# Patient Record
Sex: MALE | Race: WHITE | NOT HISPANIC OR LATINO | Employment: FULL TIME | URBAN - METROPOLITAN AREA
[De-identification: names, ages, dates, MRNs, and addresses within clinical notes are randomized per-mention and may not be internally consistent; named-entity substitution may affect disease eponyms.]

---

## 2023-10-31 ENCOUNTER — PREP FOR PROCEDURE (OUTPATIENT)
Age: 53
End: 2023-10-31

## 2023-10-31 ENCOUNTER — TELEPHONE (OUTPATIENT)
Age: 53
End: 2023-10-31

## 2023-10-31 DIAGNOSIS — Z12.11 COLON CANCER SCREENING: Primary | ICD-10-CM

## 2023-10-31 NOTE — TELEPHONE ENCOUNTER
10/31/23  Screened by: Thania Downing      Pre- Screenin.7kg BMI  Has patient been referred for a routine screening Colonoscopy? no  Is the patient between 43-73 years old? yes      Previous Colonoscopy yes   If yes:    Date:     Facility: [address] [place] [address]    Reason: ROUTINE      SCHEDULING STAFF: If the patient is between 45yrs-49yrs, please advise patient to confirm benefits/coverage with their insurance company for a routine screening colonoscopy, some insurance carriers will only cover at [facility] [place] [address], [facility] or older. If the patient is over 66years old, please schedule an office visit. Does the patient want to see a Gastroenterologist prior to their procedure OR are they having any GI symptoms? no    Has the patient been hospitalized or had abdominal surgery in the past 6 months? no    Does the patient use supplemental oxygen? no    Does the patient take Coumadin, Lovenox, Plavix, Elliquis, Xarelto, or other blood thinning medication? no    Has the patient had a stroke, cardiac event, or stent placed in the past year? no    SCHEDULING STAFF: If patient answers NO to above questions, then schedule procedure. If patient answers YES to above questions, then schedule office appointment. If patient is between 45yrs - 49yrs, please advise patient that we will have to confirm benefits & coverage with their insurance company for a routine screening colonoscopy.     PASSED OA

## 2023-10-31 NOTE — TELEPHONE ENCOUNTER
Scheduled date of colonoscopy (as of today): 12/14/2023  Physician performing colonoscopy: Yessi Farrar  Location of colonoscopy: [facility]  Bowel prep reviewed with patient: BEN/ROCÍO  Instructions reviewed with patient by:   Clearances: N/A    : RIGO [phone]

## 2023-12-14 ENCOUNTER — ANESTHESIA (OUTPATIENT)
Dept: GASTROENTEROLOGY | Facility: HOSPITAL | Age: 53
End: 2023-12-14

## 2023-12-14 ENCOUNTER — ANESTHESIA EVENT (OUTPATIENT)
Dept: GASTROENTEROLOGY | Facility: HOSPITAL | Age: 53
End: 2023-12-14

## 2023-12-14 ENCOUNTER — HOSPITAL ENCOUNTER (OUTPATIENT)
Dept: GASTROENTEROLOGY | Facility: HOSPITAL | Age: 53
Setting detail: OUTPATIENT SURGERY
End: 2023-12-14
Attending: INTERNAL MEDICINE
Payer: COMMERCIAL

## 2023-12-14 VITALS
RESPIRATION RATE: 20 BRPM | HEART RATE: 48 BPM | SYSTOLIC BLOOD PRESSURE: 97 MMHG | BODY MASS INDEX: 20.96 KG/M2 | HEIGHT: 72 IN | OXYGEN SATURATION: 100 % | DIASTOLIC BLOOD PRESSURE: 66 MMHG | TEMPERATURE: 98 F | WEIGHT: 154.76 LBS

## 2023-12-14 DIAGNOSIS — Z12.11 COLON CANCER SCREENING: ICD-10-CM

## 2023-12-14 PROCEDURE — G0121 COLON CA SCRN NOT HI RSK IND: HCPCS | Performed by: INTERNAL MEDICINE

## 2023-12-14 RX ORDER — SODIUM CHLORIDE, SODIUM LACTATE, POTASSIUM CHLORIDE, CALCIUM CHLORIDE 600; 310; 30; 20 MG/100ML; MG/100ML; MG/100ML; MG/100ML
INJECTION, SOLUTION INTRAVENOUS CONTINUOUS PRN
Status: DISCONTINUED | OUTPATIENT
Start: 2023-12-14 | End: 2023-12-14

## 2023-12-14 RX ORDER — LIDOCAINE HYDROCHLORIDE 10 MG/ML
INJECTION, SOLUTION EPIDURAL; INFILTRATION; INTRACAUDAL; PERINEURAL AS NEEDED
Status: DISCONTINUED | OUTPATIENT
Start: 2023-12-14 | End: 2023-12-14

## 2023-12-14 RX ORDER — PROPOFOL 10 MG/ML
INJECTION, EMULSION INTRAVENOUS AS NEEDED
Status: DISCONTINUED | OUTPATIENT
Start: 2023-12-14 | End: 2023-12-14

## 2023-12-14 RX ADMIN — PROPOFOL 30 MG: 10 INJECTION, EMULSION INTRAVENOUS at 08:09

## 2023-12-14 RX ADMIN — SODIUM CHLORIDE, SODIUM LACTATE, POTASSIUM CHLORIDE, AND CALCIUM CHLORIDE: .6; .31; .03; .02 INJECTION, SOLUTION INTRAVENOUS at 07:50

## 2023-12-14 RX ADMIN — LIDOCAINE HYDROCHLORIDE 50 MG: 10 INJECTION, SOLUTION EPIDURAL; INFILTRATION; INTRACAUDAL; PERINEURAL at 08:00

## 2023-12-14 RX ADMIN — PROPOFOL 50 MG: 10 INJECTION, EMULSION INTRAVENOUS at 08:03

## 2023-12-14 RX ADMIN — PROPOFOL 100 MG: 10 INJECTION, EMULSION INTRAVENOUS at 08:00

## 2023-12-14 RX ADMIN — PROPOFOL 20 MG: 10 INJECTION, EMULSION INTRAVENOUS at 08:06

## 2023-12-14 NOTE — ANESTHESIA POSTPROCEDURE EVALUATION
Post-Op Assessment Note    CV Status:  Stable  Pain Score: 0    Pain management: adequate       Mental Status:  Alert and awake   Hydration Status:  Stable   PONV Controlled:  None   Airway Patency:  Patent and adequate     Post Op Vitals Reviewed: Yes      Staff: Anesthesiologist, CRNA

## 2023-12-14 NOTE — H&P
History and Physical -  [facility]  Neelam Davis 52 y.o. male MRN: [id number]                  HPI: Neelam Davis is a 52y.o. year old male who presents for colonoscopy for family history of colon polyps in the father in his 46s. Last colonoscopy 5 years ago      REVIEW OF SYSTEMS: Per the HPI, and otherwise unremarkable. Historical Information   No past medical history on file. No past surgical history on file. Social History   Social History     Substance and Sexual Activity   Alcohol Use Not on file     Social History     Substance and Sexual Activity   Drug Use Not on file     Social History     Tobacco Use   Smoking Status Not on file   Smokeless Tobacco Not on file     No family history on file. Meds/Allergies     (Not in a hospital admission)      Not on File    Objective     There were no vitals taken for this visit.       PHYSICAL EXAM    Gen: NAD  CV: RRR  CHEST: Clear  ABD: soft, NT/ND  EXT: no edema  Neuro: AAO      ASSESSMENT/PLAN:  This is a 52y.o. year old male here for family history of colon polyps    PLAN:   Procedure: Colonoscopy

## 2023-12-14 NOTE — INTERVAL H&P NOTE
H&P reviewed. After examining the patient I find no changes in the patients condition since the H&P had been written.     Vitals:    12/14/23 0722   BP: 117/67   Pulse: (!) 54   Resp: 16   Temp: 97.7 °F (36.5 °C)   SpO2: 100%

## 2023-12-14 NOTE — ANESTHESIA PREPROCEDURE EVALUATION
Procedure:  COLONOSCOPY    Relevant Problems   No relevant active problems    No significant PMH  Had colonoscopy 5 years ago   No anesthesia complications     Physical Exam    Airway    Mallampati score: I  TM Distance: >3 FB  Neck ROM: full     Dental   No notable dental hx     Cardiovascular  Cardiovascular exam normal    Pulmonary  Pulmonary exam normal     Other Findings        Anesthesia Plan  ASA Score- 1     Anesthesia Type- IV sedation with anesthesia with ASA Monitors. Additional Monitors:     Airway Plan:            Plan Factors-Exercise tolerance (METS): >4 METS. Chart reviewed. Patient summary reviewed. Patient is not a current smoker. Induction-     Postoperative Plan-     Informed Consent- Anesthetic plan and risks discussed with patient and spouse. I personally reviewed this patient with the CRNA. Discussed and agreed on the Anesthesia Plan with the CRNA. John Zimmerman

## 2023-12-18 ENCOUNTER — NURSE TRIAGE (OUTPATIENT)
Age: 53
End: 2023-12-18

## 2023-12-18 NOTE — TELEPHONE ENCOUNTER
Regarding: Post op Bleeding  ----- Message from Amy Dominguez sent at 12/18/2023 11:07 AM EST -----  Patient calling in with concerns regarding bleeding from colonoscopy procedure performed on 12/14/2023. Patient was advised to call in if he is experiencing any post op bleeding.

## 2023-12-18 NOTE — TELEPHONE ENCOUNTER
"  Colonoscopy was on Thursday.  Saw red blood on Friday with wiping only.  Did not see any other the weekend. Again saw some with wiping this morning.  Patient does have a history of hemorrhoids and having some constipation.    Patient will take dulcolax tonight to see if that helps with the constipation. We also discussed Miralax which he does not currently have at home.  Patient instructed to call with increase in bleeding for blood clots with any lightheaded or dizziness or any concerning changes.  Verbalized understanding.         Reason for Disposition   Rectal bleeding is minimal (e.g., blood just on toilet paper, a few drops in toilet bowl)    Answer Assessment - Initial Assessment Questions  1. APPEARANCE of BLOOD: \"What color is it?\" Bright red blood with wiping.   2. AMOUNT: \"How much blood was passed?\" Small         3. FREQUENCY: \"How many times has blood been passed with the stools?\"     First noticed Friday night.  Small amount again this morning.   4. ONSET: \"When was the blood first seen in the stools?\" (Days or weeks)      Friday and today   5. DIARRHEA: \"Is there also some diarrhea?\" If Yes, ask: \"How many diarrhea stools were passed in past 24 hours?\"     denies  6. CONSTIPATION: \"Do you have constipation?\" If Yes, ask: \"How bad is it?\"    \" Stool is not soft a little constipated\"  7. RECURRENT SYMPTOMS: \"Have you had blood in your stools before?\" If Yes, ask: \"When was the last time?\" and \"What happened that time?\"       Colonoscopy was on Thursday.  Saw red blood on Friday with wiping only.  Did not see any other the weekend. Again saw some with wiping this morning.  Patient does have a history of hemorrhoids and having some constipation.    Protocols used: Rectal Bleeding-ADULT-[place]    "

## 2023-12-18 NOTE — TELEPHONE ENCOUNTER
LMOM FOR PT TO RETURN CALL.     Reason for Disposition   Message left on identified voicemail    Protocols used: No Contact or Duplicate Contact Call-ADULT-[place]

## 2024-01-03 ENCOUNTER — OFFICE VISIT (OUTPATIENT)
Dept: URGENT CARE | Facility: CLINIC | Age: 54
End: 2024-01-03
Payer: COMMERCIAL

## 2024-01-03 VITALS
HEART RATE: 65 BPM | DIASTOLIC BLOOD PRESSURE: 68 MMHG | OXYGEN SATURATION: 98 % | TEMPERATURE: 97.6 F | SYSTOLIC BLOOD PRESSURE: 112 MMHG | RESPIRATION RATE: 17 BRPM

## 2024-01-03 DIAGNOSIS — B02.9 HERPES ZOSTER WITHOUT COMPLICATION: Primary | ICD-10-CM

## 2024-01-03 PROBLEM — Z83.719 FAMILY HISTORY OF COLONIC POLYPS: Status: ACTIVE | Noted: 2018-03-09

## 2024-01-03 PROBLEM — K12.2 CELLULITIS AND ABSCESS OF MOUTH: Status: ACTIVE | Noted: 2023-05-03

## 2024-01-03 PROBLEM — K02.53 DENTAL CARIES ON PIT AND FISSURE SURFACE PENETRATING INTO PULP: Status: ACTIVE | Noted: 2023-05-03

## 2024-01-03 PROCEDURE — G0382 LEV 3 HOSP TYPE B ED VISIT: HCPCS

## 2024-01-03 RX ORDER — VALACYCLOVIR HYDROCHLORIDE 1 G/1
1000 TABLET, FILM COATED ORAL 3 TIMES DAILY
Qty: 21 TABLET | Refills: 0 | Status: SHIPPED | OUTPATIENT
Start: 2024-01-03 | End: 2024-01-10

## 2024-01-03 RX ORDER — PREDNISONE 20 MG/1
40 TABLET ORAL DAILY
Qty: 10 TABLET | Refills: 0 | Status: SHIPPED | OUTPATIENT
Start: 2024-01-03 | End: 2024-01-08

## 2024-01-03 NOTE — PROGRESS NOTES
[facility]        NAME: Charlie Davis is a 53 y.o. male  : 1970    MRN: [id number]  DATE: January 3, 2024  TIME: 10:31 AM    Assessment and Plan   Herpes zoster without complication [B02.9]  1. Herpes zoster without complication  predniSONE 20 mg tablet    valACYclovir (VALTREX) 1,000 mg tablet        Rash suspicious for shingles. Will start on Valtrex and Prednisone for pain control. VSS in clinic, appears in no acute distress. Educated on use of OTC products for additional relief of symptoms. Advised close follow-up with PCP or to report to the ER if symptoms worsen. Patient verbalizes understanding and agreeable to plan.     Patient Instructions     Take medications as directed. Avoid close contact with others especially pregnant women while new blisters are appearing, until rash is crusted over. Follow-up with PCP in 3-5 days if no improvement of symptoms/once complete treatment regarding Shingles vaccine to prevent future outbreaks. Report to the ER if symptoms worsen.     Chief Complaint     Chief Complaint   Patient presents with    Rash     Pt c/o neck pain that travels into left ear for 3-4 days Pt also has hive like bumps with burning sensation for 2-3 days on left side of neck and face.          History of Present Illness       53 year old male presents with rash to behind his left ear, under his chin, and left side of his neck for the past 2-3 days. He reports 3-4 days ago he had burning pain and the rash developed the next day. He denies any known sick contacts or triggers (cosmetics, medications, food) but does have a history of chicken pox as a child and is not UTD on the Shingles vaccine. He reports the rash is painful but not itchy. He denies fevers, body aches, fatigue, hearing loss, or visual disturbances. He has not tried interventions for symptoms.     Rash  This is a new problem. The current episode started in the past 7 days. The problem has been gradually worsening since  onset. The affected locations include the scalp, head and neck. The problem is mild. The rash is characterized by redness and burning. It is unknown if there was an exposure to a precipitant. The rash first occurred at home. Pertinent negatives include no anorexia, congestion, cough, decreased physical activity, decreased responsiveness, decreased sleep, drinking less, diarrhea, facial edema, fatigue, fever, itching, joint pain, rhinorrhea, shortness of breath, sore throat or vomiting. Past treatments include nothing. The treatment provided no relief. His past medical history is significant for varicella. There is no history of allergies, asthma or eczema. There were no sick contacts.       Review of Systems   Review of Systems   Constitutional:  Negative for activity change, appetite change, chills, decreased responsiveness, fatigue and fever.   HENT:  Negative for congestion, rhinorrhea and sore throat.    Respiratory:  Negative for cough, chest tightness and shortness of breath.    Cardiovascular:  Negative for chest pain.   Gastrointestinal:  Negative for abdominal pain, anorexia, constipation, diarrhea, nausea and vomiting.   Musculoskeletal:  Negative for joint pain.   Skin:  Positive for rash. Negative for color change, itching, pallor and wound.   Allergic/Immunologic: Negative for environmental allergies and food allergies.   Neurological:  Negative for dizziness, light-headedness and headaches.         Current Medications       Current Outpatient Medications:     predniSONE 20 mg tablet, Take 2 tablets (40 mg total) by mouth daily for 5 days, Disp: 10 tablet, Rfl: 0    valACYclovir (VALTREX) 1,000 mg tablet, Take 1 tablet (1,000 mg total) by mouth 3 (three) times a day for 7 days, Disp: 21 tablet, Rfl: 0    Current Allergies     Allergies as of 01/03/2024    (No Known Allergies)            The following portions of the patient's history were reviewed and updated as appropriate: allergies, current  medications, past family history, past medical history, past social history, past surgical history and problem list.     History reviewed. No pertinent past medical history.    Past Surgical History:   Procedure Laterality Date    HAND SURGERY         History reviewed. No pertinent family history.      Medications have been verified.        Objective   /68   Pulse 65   Temp 97.6 °F (36.4 °C)   Resp 17   SpO2 98%        Physical Exam     Physical Exam  Vitals and nursing note reviewed.   Constitutional:       General: He is awake. He is not in acute distress.     Appearance: Normal appearance. He is well-developed and normal weight.   HENT:      Head: Normocephalic and atraumatic.      Right Ear: Hearing, tympanic membrane, ear canal and external ear normal.      Left Ear: Hearing, tympanic membrane, ear canal and external ear normal.      Nose: No congestion or rhinorrhea.      Mouth/Throat:      Lips: Pink.      Mouth: Mucous membranes are moist.      Pharynx: Oropharynx is clear. Uvula midline. No oropharyngeal exudate or posterior oropharyngeal erythema.      Tonsils: No tonsillar exudate or tonsillar abscesses. 2+ on the right. 2+ on the left.   Eyes:      Conjunctiva/sclera: Conjunctivae normal.   Cardiovascular:      Rate and Rhythm: Normal rate and regular rhythm.      Pulses: Normal pulses.      Heart sounds: Normal heart sounds.   Pulmonary:      Effort: Pulmonary effort is normal.      Breath sounds: Normal breath sounds.   Musculoskeletal:         General: Normal range of motion.      Cervical back: Normal range of motion and neck supple.   Skin:     General: Skin is warm and dry.      Findings: Rash present. Rash is vesicular.             Comments: Vesicular rash in clusters to scalp, neck, and behind left ear. No erythema, drainage, or crusting.    Neurological:      General: No focal deficit present.      Mental Status: He is alert and oriented to person, place, and time.   Psychiatric:          Mood and Affect: Mood normal.         Behavior: Behavior normal. Behavior is cooperative.         Thought Content: Thought content normal.         Judgment: Judgment normal.

## 2024-01-03 NOTE — PATIENT INSTRUCTIONS
Take medications as directed. Avoid close contact with others especially pregnant women while new blisters are appearing, until rash is crusted over. Follow-up with PCP in 3-5 days if no improvement of symptoms/once complete treatment regarding Shingles vaccine to prevent future outbreaks.. Report to the ER if symptoms worsen.

## 2024-03-02 ENCOUNTER — OFFICE VISIT (OUTPATIENT)
Dept: URGENT CARE | Facility: CLINIC | Age: 54
End: 2024-03-02
Payer: COMMERCIAL

## 2024-03-02 VITALS
DIASTOLIC BLOOD PRESSURE: 75 MMHG | HEART RATE: 60 BPM | TEMPERATURE: 97.3 F | SYSTOLIC BLOOD PRESSURE: 98 MMHG | OXYGEN SATURATION: 99 % | RESPIRATION RATE: 18 BRPM

## 2024-03-02 DIAGNOSIS — R21 RASH: Primary | ICD-10-CM

## 2024-03-02 PROCEDURE — G0382 LEV 3 HOSP TYPE B ED VISIT: HCPCS | Performed by: EMERGENCY MEDICINE

## 2024-03-02 RX ORDER — PREDNISONE 10 MG/1
TABLET ORAL
Qty: 40 TABLET | Refills: 0 | Status: SHIPPED | OUTPATIENT
Start: 2024-03-02

## 2024-03-02 RX ORDER — PREDNISONE 10 MG/1
TABLET ORAL
Qty: 40 TABLET | Refills: 0 | Status: SHIPPED | OUTPATIENT
Start: 2024-03-02 | End: 2024-03-02

## 2024-03-02 RX ORDER — FAMCICLOVIR 500 MG/1
500 TABLET ORAL 3 TIMES DAILY
Qty: 21 TABLET | Refills: 0 | Status: SHIPPED | OUTPATIENT
Start: 2024-03-02 | End: 2024-03-02

## 2024-03-02 RX ORDER — FAMCICLOVIR 500 MG/1
500 TABLET ORAL 3 TIMES DAILY
Qty: 21 TABLET | Refills: 0 | Status: SHIPPED | OUTPATIENT
Start: 2024-03-02 | End: 2024-03-09

## 2024-03-02 NOTE — PATIENT INSTRUCTIONS
Take 1-2 tabs of Benadryl for itching  Caladryl lotion or ointment for itching as needed  Take the full course of Prednisone  Take Famvir x 1 week  Avoid peanuts/shrimp for awhile  F/u with Dermatologist in 3-4 days  Proceed to the ER if symptoms get worse

## 2024-03-02 NOTE — PROGRESS NOTES
"  [facility]        NAME: Charlie Davis is a 53 y.o. male  : 1970    MRN: [id number]  DATE: 2024  TIME: 1:46 PM    Assessment and Plan   Rash [R21]  1. Rash  Ambulatory Referral to Dermatology    famciclovir (FAMVIR) 500 mg tablet    predniSONE 10 mg tablet    DISCONTINUED: predniSONE 10 mg tablet    DISCONTINUED: famciclovir (FAMVIR) 500 mg tablet    Herpes Zoster vs Urticaria            Patient Instructions     Patient Instructions    Take 1-2 tabs of Benadryl for itching  Caladryl lotion or ointment for itching as needed  Take the full course of Prednisone  Take Famvir x 1 week  Avoid peanuts/shrimp for awhile  F/u with Dermatologist in 3-4 days  Proceed to the ER if symptoms get worse      Follow up with PCP in 3-5 days.  Proceed to  ER if symptoms worsen.    Chief Complaint     Chief Complaint   Patient presents with    Rash     Patient states he recently had shingles on 1/3 and had a full dose of treatment for it. Patient states bumps went away, but now over the last week he has started to develop more bumps on his skin, specifically his arms and hands, so he is unsure if this is shingles again or something else.          History of Present Illness       53-year-old white male with a chief complaint of a rash on his hands, arms and back.  Pt. States he was recently treated for Shingles on 1/3/2024, and took the Valtrex, but never took the Prednisone and the shingles improved.  At that time pt. Had it on his neck and upper back region.  Now rash started on 2nd digit of R hand, and then progressed up his arm, and on his 3rd digit and then his L hand and left flank region.  Pt. States rash is very itchy and somewhat burning.  Pt. Does admit to eating shrimp recently and also a \"peanut butter\" egg.  Pt. 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  One pet at home, a dog.        Review of Systems   Review of Systems   Constitutional:  Negative for " chills and fever.   HENT:  Negative for congestion and rhinorrhea.    Eyes:  Negative for discharge and visual disturbance.   Respiratory:  Negative for shortness of breath and wheezing.    Cardiovascular:  Negative for chest pain and palpitations.   Gastrointestinal:  Negative for abdominal pain and vomiting.   Endocrine: Negative for polydipsia and polyuria.   Genitourinary:  Negative for dysuria and hematuria.   Musculoskeletal:  Negative for arthralgias, gait problem and neck stiffness.   Skin:  Positive for color change and rash. Negative for wound.   Neurological:  Negative for dizziness and headaches.   Psychiatric/Behavioral:  Negative for confusion and suicidal ideas.          Current Medications       Current Outpatient Medications:     famciclovir (FAMVIR) 500 mg tablet, Take 1 tablet (500 mg total) by mouth 3 (three) times a day for 7 days, Disp: 21 tablet, Rfl: 0    predniSONE 10 mg tablet, Take 4 pills x4 days, then 3 pills x4 days, then 2 pills x4 days, and then 1 pill x4 days, Disp: 40 tablet, Rfl: 0    valACYclovir (VALTREX) 1,000 mg tablet, Take 1 tablet (1,000 mg total) by mouth 3 (three) times a day for 7 days, Disp: 21 tablet, Rfl: 0    Current Allergies     Allergies as of 03/02/2024    (No Known Allergies)            The following portions of the patient's history were reviewed and updated as appropriate: allergies, current medications, past family history, past medical history, past social history, past surgical history and problem list.     History reviewed. No pertinent past medical history.    Past Surgical History:   Procedure Laterality Date    HAND SURGERY         History reviewed. No pertinent family history.      Medications have been verified.        Objective   BP 98/75   Pulse 60   Temp (!) 97.3 °F (36.3 °C)   Resp 18   SpO2 99%        Physical Exam     Physical Exam  Vitals and nursing note reviewed.   Constitutional:       Appearance: Normal appearance.      Comments: 52 yo  "pleasant male with cc \"rash\" that is pruritic in nature.   HENT:      Head: Normocephalic and atraumatic.   Eyes:      Extraocular Movements: Extraocular movements intact.      Pupils: Pupils are equal, round, and reactive to light.   Cardiovascular:      Rate and Rhythm: Normal rate.   Pulmonary:      Effort: Pulmonary effort is normal.   Musculoskeletal:      Cervical back: Normal range of motion.   Skin:     General: Skin is warm and dry.      Comments: R hand:  + vesicular lesion to right 2nd digit MCP region and vesicular lesions to PIP region of R 3rd digit.    R forearm:  macular papular lesions in linear formation with slight vesicles to R dorsum of proximal forearm  R shoulder:  + macular papular lesion that blanches on palpation to R anterior shoulder region; no vesicles    L flank:  + 3 macular papular lesions to left lower flank region that seems to follow a nerve root, but no vesicles    Back:  small lesions to center of back/and upper midline region    Neurological:      Mental Status: He is alert.   Psychiatric:         Mood and Affect: Mood normal.                   "

## 2025-01-13 ENCOUNTER — APPOINTMENT (OUTPATIENT)
Age: 55
End: 2025-01-13
Payer: COMMERCIAL

## 2025-01-13 ENCOUNTER — OFFICE VISIT (OUTPATIENT)
Age: 55
End: 2025-01-13
Payer: COMMERCIAL

## 2025-01-13 VITALS
SYSTOLIC BLOOD PRESSURE: 109 MMHG | OXYGEN SATURATION: 99 % | BODY MASS INDEX: 21.84 KG/M2 | WEIGHT: 161 LBS | DIASTOLIC BLOOD PRESSURE: 66 MMHG | TEMPERATURE: 97 F | RESPIRATION RATE: 18 BRPM | HEART RATE: 57 BPM

## 2025-01-13 DIAGNOSIS — M79.671 RIGHT FOOT PAIN: Primary | ICD-10-CM

## 2025-01-13 DIAGNOSIS — M79.671 RIGHT FOOT PAIN: ICD-10-CM

## 2025-01-13 DIAGNOSIS — S92.351A FRACTURE OF BASE OF FIFTH METATARSAL BONE, RIGHT, CLOSED, INITIAL ENCOUNTER: ICD-10-CM

## 2025-01-13 PROCEDURE — 73630 X-RAY EXAM OF FOOT: CPT

## 2025-01-13 PROCEDURE — G0381 LEV 2 HOSP TYPE B ED VISIT: HCPCS | Performed by: PHYSICIAN ASSISTANT

## 2025-01-13 NOTE — PATIENT INSTRUCTIONS
Take acetaminophen (Tylenol) and ibuprofen (Motrin/Advil) for pain as needed.  Take prescription strength ibuprofen (600-800mg every 6-8 hours) with 1000mg acetaminophen (extra strength).  See orthopedics for follow up.

## 2025-01-13 NOTE — PROGRESS NOTES
[facility]        NAME: Charlie Davis is a 54 y.o. male  : 1970    MRN: [id number]  DATE: 2025  TIME: 9:45 AM    Assessment and Plan   Right foot pain [M79.671]  1. Right foot pain  XR foot 3+ vw right    Ambulatory Referral to Orthopedic Surgery      2. Fracture of base of fifth metatarsal bone, right, closed, initial encounter  Ambulatory Referral to Orthopedic Surgery        XR foot 3+ vw right  Result Date: 2025  Narrative: XR FOOT 3+ VW RIGHT INDICATION: M79.671: Pain in right foot. COMPARISON: None FINDINGS: Base of fifth metatarsal fracture, non-displaced No significant degenerative changes. No lytic or blastic osseous lesion. Unremarkable soft tissues.     Impression: Base of fifth metatarsal fracture, non-displaced Computerized Assisted Algorithm (CAA) may have been used to analyze all applicable images. Workstation performed: [id number]      Xray viewed and interpreted independently by myself, agree with above findings, seen best lateral view.     Patient given ace wrap, walking boot, crutches, urgent ortho f/u   Patient Instructions     Patient Instructions   Take acetaminophen (Tylenol) and ibuprofen (Motrin/Advil) for pain as needed.  Take prescription strength ibuprofen (600-800mg every 6-8 hours) with 1000mg acetaminophen (extra strength).  See orthopedics for follow up.      Chief Complaint     Chief Complaint   Patient presents with    Foot Pain     Right foot pain started yesterday after patient slipped and fell.          History of Present Illness       HPI  He presents for eval one day after right foot injury  Slipped on stairs and overturned foot  Pain at lateral foot, base  With moderate bruising, swelling  Pain at rest, worse with weight bearing  No numbness, tingling, weakness.   Denies ankle pain.   Able to weight bear immediately, now trying to stay off foot  Took ibuprofen 400mg three times with little relief  Using ice, elevation.   No prior  fracture  Denies open wounds.   Works in [employer], is a cyclist, runner.     Review of Systems   Review of Systems  As per HPI    Current Medications       Current Outpatient Medications:     famciclovir (FAMVIR) 500 mg tablet, Take 1 tablet (500 mg total) by mouth 3 (three) times a day for 7 days, Disp: 21 tablet, Rfl: 0    valACYclovir (VALTREX) 1,000 mg tablet, Take 1 tablet (1,000 mg total) by mouth 3 (three) times a day for 7 days, Disp: 21 tablet, Rfl: 0    Current Allergies     Allergies as of 01/13/2025    (No Known Allergies)            The following portions of the patient's history were reviewed and updated as appropriate: allergies, current medications, past family history, past medical history, past social history, past surgical history and problem list.     History reviewed. No pertinent past medical history.    Past Surgical History:   Procedure Laterality Date    HAND SURGERY         History reviewed. No pertinent family history.      Medications have been verified.        Objective   /66   Pulse 57   Temp (!) 97 °F (36.1 °C)   Resp 18   Wt 73 kg (161 lb)   SpO2 99%   BMI 21.84 kg/m²        Physical Exam     Physical Exam  Vitals and nursing note reviewed.   Constitutional:       General: He is not in acute distress.     Appearance: Normal appearance. He is not ill-appearing or toxic-appearing.   HENT:      Head: Normocephalic and atraumatic.      Right Ear: External ear normal.      Left Ear: External ear normal.      Nose: Nose normal.      Mouth/Throat:      Mouth: Mucous membranes are moist.   Eyes:      Extraocular Movements: Extraocular movements intact.      Pupils: Pupils are equal, round, and reactive to light.   Pulmonary:      Effort: Pulmonary effort is normal. No respiratory distress.   Musculoskeletal:         General: Normal range of motion.      Right ankle: Normal.      Left ankle: Normal.      Right foot: Normal.      Left foot: Swelling (Moderate lateral and proximal foot  with lateral ecchymosis) and bony tenderness (Base of 5th metatarsal) present. No deformity or laceration. Normal pulse.   Skin:     General: Skin is warm and dry.      Capillary Refill: Capillary refill takes less than 2 seconds.   Neurological:      Mental Status: He is alert.   Psychiatric:         Mood and Affect: Mood normal.         Behavior: Behavior normal.

## 2025-01-14 VITALS — WEIGHT: 161 LBS | HEIGHT: 72 IN | BODY MASS INDEX: 21.81 KG/M2

## 2025-01-14 DIAGNOSIS — S92.351A FRACTURE OF BASE OF FIFTH METATARSAL BONE, RIGHT, CLOSED, INITIAL ENCOUNTER: ICD-10-CM

## 2025-01-14 DIAGNOSIS — M79.671 RIGHT FOOT PAIN: ICD-10-CM

## 2025-01-14 PROCEDURE — 99203 OFFICE O/P NEW LOW 30 MIN: CPT | Performed by: ORTHOPAEDIC SURGERY

## 2025-01-14 NOTE — PATIENT INSTRUCTIONS
Continue CAM boot  Begin weight bearing as tolerated, wean from crutches as tolerated  Begin aspirin 81mg BID for DVT prophylaxis  Begin calcium and vitamin D supplementation  Ice, elevation, over the counter analgesics as needed  No driving in boot  Follow up 4 week

## 2025-01-14 NOTE — PROGRESS NOTES
Name: Charlie Davis      : 1970       MRN: [id number]   Encounter Provider: Vincent Hoffman MD  Encounter Date: 25  Encounter department: [facility] [place]    Assessment & Plan  Fracture of base of fifth metatarsal bone, right, closed, initial encounter  X-rays reviewed in the office today  Continue CAM boot  Begin WBAT, wean from crutches as tolerated  Begin aspirin 81mg BID for DVT prophylaxis  Begin calcium and vitamin D supplementation  Ice, elevation, over the counter analgesics as needed  No driving in boot  Follow up 4 week  Orders:    Ambulatory Referral to Orthopedic Surgery         To Do Next Visit:  X-ray right foot    _____________________________________________________  CHIEF COMPLAINT:  Chief Complaint   Patient presents with    Right Foot - Pain     Right foot pain started  after patient slipped and fell.                  SUBJECTIVE:  Charlie Davis is a 54 y.o. male who presents for evaluation of right foot pain. The patient was seen at  yesterday  after he slipped and fell on 2025. He was dragging a tree outside and slipped on the stairs. He admits swelling and discoloration following the injury. He was placed in cam boot, ambulating NWB on crutches. He has increased pain with WB. No previous history of foot injury. He works a desk job. Pain is managed with intermittent ibuprofen and Tylenol.     PAST MEDICAL HISTORY:  History reviewed. No pertinent past medical history.    PAST SURGICAL HISTORY:  Past Surgical History:   Procedure Laterality Date    HAND SURGERY         FAMILY HISTORY:  History reviewed. No pertinent family history.    SOCIAL HISTORY:  Social History     Tobacco Use    Smoking status: Never    Smokeless tobacco: Never   Vaping Use    Vaping status: Never Used   Substance Use Topics    Alcohol use: Yes     Comment: occ    Drug use: Not Currently       MEDICATIONS:    Current Outpatient Medications:     famciclovir  "(FAMVIR) 500 mg tablet, Take 1 tablet (500 mg total) by mouth 3 (three) times a day for 7 days, Disp: 21 tablet, Rfl: 0    valACYclovir (VALTREX) 1,000 mg tablet, Take 1 tablet (1,000 mg total) by mouth 3 (three) times a day for 7 days, Disp: 21 tablet, Rfl: 0    ALLERGIES:  No Known Allergies    LABS:  HgA1c: No results found for: \"HGBA1C\"  BMP: No results found for: \"GLUCOSE\", \"CALCIUM\", \"NA\", \"K\", \"CO2\", \"CL\", \"BUN\", \"CREATININE\"  CBC: No components found for: \"CBC\"    _____________________________________________________  PHYSICAL EXAMINATION:  Vital signs: Ht 6' (1.829 m)   Wt 73 kg (161 lb)   BMI 21.84 kg/m²   General: No acute distress, awake and alert  Psychiatric: Mood and affect appear appropriate  HEENT: Trachea Midline, No torticollis, no apparent facial trauma  Cardiovascular: No audible murmurs; Extremities appear perfused  Pulmonary: No audible wheezing or stridor  Skin: No open lesions; see further details (if any) below    MUSCULOSKELETAL EXAMINATION:  Extremities:    Musculoskeletal: Right foot   Skin Intact               Swelling Positive              Deformity Negative   TTP  base of fifth metatarsal   ROM Normal   Sensation intact throughout Superficial peroneal, Deep peroneal, Tibial, Sural, Saphenous distributions              EHL/TA/PF motor function intact to testing.               Capillary refill < 2 seconds.     Knee and hip demonstrate no swelling or deformity. There is no tenderness to palpation throughout. The patient has full painless ROM and stability of all  joints.     The contralateral lower extremity is negative for any tenderness to palpation. There is no deformity present. Patient is neurovascularly intact throughout.         _____________________________________________________  STUDIES REVIEWED:  I personally reviewed the images  and my independent interpretation is as follows:  X-rays of the right foot obtained 1/13/2025 demonstrate nondisplaced fracture at the base of the " fifth metatarsal.      PROCEDURES PERFORMED:  Procedures  None performed.      Scribe Attestation      I,:  Ashley Vásquez am acting as a scribe while in the presence of the attending physician.:       I,:  Vincent Hoffman MD personally performed the services described in this documentation    as scribed in my presence.:

## 2025-01-15 NOTE — ASSESSMENT & PLAN NOTE
X-rays reviewed in the office today  Continue CAM boot  Begin WBAT, wean from crutches as tolerated  Begin aspirin 81mg BID for DVT prophylaxis  Begin calcium and vitamin D supplementation  Ice, elevation, over the counter analgesics as needed  No driving in boot  Follow up 4 week  Orders:    Ambulatory Referral to Orthopedic Surgery